# Patient Record
Sex: FEMALE | Race: WHITE | URBAN - METROPOLITAN AREA
[De-identification: names, ages, dates, MRNs, and addresses within clinical notes are randomized per-mention and may not be internally consistent; named-entity substitution may affect disease eponyms.]

---

## 2020-09-14 ENCOUNTER — OUTPATIENT (OUTPATIENT)
Dept: OUTPATIENT SERVICES | Facility: HOSPITAL | Age: 70
LOS: 1 days | End: 2020-09-14

## 2022-10-26 ENCOUNTER — NON-APPOINTMENT (OUTPATIENT)
Age: 72
End: 2022-10-26

## 2022-10-26 DIAGNOSIS — E78.5 HYPERLIPIDEMIA, UNSPECIFIED: ICD-10-CM

## 2022-10-26 DIAGNOSIS — F17.200 NICOTINE DEPENDENCE, UNSPECIFIED, UNCOMPLICATED: ICD-10-CM

## 2022-10-26 DIAGNOSIS — Z85.9 PERSONAL HISTORY OF MALIGNANT NEOPLASM, UNSPECIFIED: ICD-10-CM

## 2022-10-26 PROBLEM — Z00.00 ENCOUNTER FOR PREVENTIVE HEALTH EXAMINATION: Status: ACTIVE | Noted: 2022-10-26

## 2022-10-26 RX ORDER — ALBUTEROL SULFATE 90 UG/1
AEROSOL, METERED RESPIRATORY (INHALATION)
Refills: 0 | Status: ACTIVE | COMMUNITY

## 2022-10-26 RX ORDER — PANTOPRAZOLE SODIUM 20 MG/1
TABLET, DELAYED RELEASE ORAL
Refills: 0 | Status: ACTIVE | COMMUNITY

## 2022-10-26 RX ORDER — BUDESONIDE AND FORMOTEROL FUMARATE DIHYDRATE 160; 4.5 UG/1; UG/1
AEROSOL RESPIRATORY (INHALATION)
Refills: 0 | Status: ACTIVE | COMMUNITY

## 2022-10-26 RX ORDER — ESCITALOPRAM OXALATE 5 MG/1
TABLET, FILM COATED ORAL
Refills: 0 | Status: ACTIVE | COMMUNITY

## 2022-10-26 RX ORDER — FLUTICASONE PROPIONATE 44 UG/1
44 AEROSOL, METERED RESPIRATORY (INHALATION)
Refills: 0 | Status: ACTIVE | COMMUNITY

## 2022-11-18 ENCOUNTER — NON-APPOINTMENT (OUTPATIENT)
Age: 72
End: 2022-11-18

## 2023-01-20 ENCOUNTER — APPOINTMENT (OUTPATIENT)
Dept: ENDOCRINOLOGY | Facility: CLINIC | Age: 73
End: 2023-01-20
Payer: MEDICARE

## 2023-01-20 VITALS
TEMPERATURE: 97 F | SYSTOLIC BLOOD PRESSURE: 134 MMHG | RESPIRATION RATE: 18 BRPM | OXYGEN SATURATION: 95 % | DIASTOLIC BLOOD PRESSURE: 86 MMHG | HEIGHT: 63 IN | WEIGHT: 135 LBS | BODY MASS INDEX: 23.92 KG/M2 | HEART RATE: 93 BPM

## 2023-01-20 PROCEDURE — 99213 OFFICE O/P EST LOW 20 MIN: CPT

## 2023-01-20 NOTE — HISTORY OF PRESENT ILLNESS
[FreeTextEntry1] : This is a pleasant 72-year-old white female with a past medical history of hypothyroidism and is currently on levothyroxine 125 mcg tablets every day.  Patient had developed thyroid disease after undergoing surgery for microscopic papillary CA and also removal of 2 glands of the parathyroid.  Patient also has a history of asthma hyperlipidemia COPD and anxiety.  Patient currently denies any significant symptoms.  She claimed that she is trying to quit smoking and is cutting down on the number of cigarettes every day.  Her weight has been stable her appetite is good but she is not able to walk due to shortness of breath.  Patient is compliant with her medication.

## 2023-01-20 NOTE — PHYSICAL EXAM
[Alert] : alert [No Acute Distress] : no acute distress [Normal Sclera/Conjunctiva] : normal sclera/conjunctiva [EOMI] : extra ocular movement intact [PERRL] : pupils equal, round and reactive to light [Normal Outer Ear/Nose] : the ears and nose were normal in appearance [No Neck Mass] : no neck mass was observed [Well Healed Scar] : well healed scar [No Respiratory Distress] : no respiratory distress [Normal S1, S2] : normal S1 and S2 [No Murmurs] : no murmurs [Normal Rate] : heart rate was normal [Regular Rhythm] : with a regular rhythm [Carotids Normal] : carotid pulses were normal with no bruits [Pedal Pulses Normal] : the pedal pulses are present [Not Tender] : non-tender [Soft] : abdomen soft [No HSM] : no hepato-splenomegaly [Normal Supraclavicular Nodes] : no supraclavicular lymphadenopathy [Normal Anterior Cervical Nodes] : no anterior cervical lymphadenopathy [Normal Posterior Cervical Nodes] : no posterior cervical lymphadenopathy [No CVA Tenderness] : no ~M costovertebral angle tenderness [Normal Gait] : normal gait [No Clubbing, Cyanosis] : no clubbing  or cyanosis of the fingernails [No Rash] : no rash [No Skin Lesions] : no skin lesions [No Motor Deficits] : the motor exam was normal [No Sensory Deficits] : the sensory exam was normal to light touch and pinprick [Normal Reflexes] : deep tendon reflexes were 2+ and symmetric [No Tremors] : no tremors [Oriented x3] : oriented to person, place, and time [de-identified] : Few bilateral rhonchi [de-identified] : Deferred [FreeTextEntry1] : Deferred [de-identified] : Deferred [de-identified] : Denies any palpitations skin rash or joint pain [de-identified] : Decreased skin turgor

## 2023-01-20 NOTE — REVIEW OF SYSTEMS
[Fatigue] : fatigue [Decreased Appetite] : decreased appetite [Shortness Of Breath] : shortness of breath [Cough] : cough [Wheezing] : wheezing [SOB on Exertion] : shortness of breath on exertion [Cold Intolerance] : no cold intolerance [Heat Intolerance] : no heat intolerance [Negative] : Heme/Lymph

## 2023-01-20 NOTE — ASSESSMENT
[FreeTextEntry1] : Middle-aged white female who has past medical history of postsurgical hypothyroidism and who is currently maintained on levothyroxine 125 mcg tablets every day.  She recently had some fluctuations in her TSH level but her last level from January 17, 2023 shows the TSH is normal at 3.84 and the free T4 is 1.36.  Patient is clinically euthyroid.  Recommendation\par 1.  We will continue the levothyroxine at 125 mcg tablets every day.\par #2 we will repeat her sonogram of the thyroid and also a serum thyroglobulin level with with serum calcium on her next visit in 6 months time.  The plan was discussed in detail with the patient.\par

## 2023-05-19 ENCOUNTER — NON-APPOINTMENT (OUTPATIENT)
Age: 73
End: 2023-05-19

## 2023-05-19 ENCOUNTER — APPOINTMENT (OUTPATIENT)
Dept: PULMONOLOGY | Facility: CLINIC | Age: 73
End: 2023-05-19
Payer: MEDICARE

## 2023-05-19 VITALS
TEMPERATURE: 98 F | DIASTOLIC BLOOD PRESSURE: 76 MMHG | BODY MASS INDEX: 27.99 KG/M2 | HEART RATE: 85 BPM | WEIGHT: 157.98 LBS | SYSTOLIC BLOOD PRESSURE: 138 MMHG | OXYGEN SATURATION: 98 % | HEIGHT: 63 IN

## 2023-05-19 PROCEDURE — 99204 OFFICE O/P NEW MOD 45 MIN: CPT | Mod: 25

## 2023-05-19 PROCEDURE — 94010 BREATHING CAPACITY TEST: CPT

## 2023-05-19 RX ORDER — PLANT STANOL ESTER 450 MG
TABLET ORAL
Refills: 0 | Status: ACTIVE | COMMUNITY

## 2023-05-19 RX ORDER — PREDNISONE 10 MG/1
10 TABLET ORAL
Qty: 30 | Refills: 1 | Status: ACTIVE | COMMUNITY
Start: 2023-05-19 | End: 1900-01-01

## 2023-05-19 NOTE — REASON FOR VISIT
[Initial] : an initial visit [Asthma] : asthma [Cough] : cough [COPD] : COPD [Shortness of Breath] : shortness of breath [Wheezing] : wheezing [TextBox_44] : Patient has not followed up in a few years.  Patient still smokes.  She does have a wet yellow phlegm cough, SOB with exertion, Wheezing if she does not use the nebulizer.

## 2023-05-19 NOTE — PHYSICAL EXAM
[No Acute Distress] : no acute distress [Normal Oropharynx] : normal oropharynx [Normal Appearance] : normal appearance [No Neck Mass] : no neck mass [Normal Rate/Rhythm] : normal rate/rhythm [Normal S1, S2] : normal s1, s2 [No Murmurs] : no murmurs [No Resp Distress] : no resp distress [Clear to Auscultation Bilaterally] : clear to auscultation bilaterally [No Abnormalities] : no abnormalities [Benign] : benign [Normal Gait] : normal gait [No Clubbing] : no clubbing [No Cyanosis] : no cyanosis [No Edema] : no edema [FROM] : FROM [Normal Color/ Pigmentation] : normal color/ pigmentation [No Focal Deficits] : no focal deficits [Oriented x3] : oriented x3 [Normal Affect] : normal affect [TextBox_68] : marked decrease bs

## 2023-05-19 NOTE — HISTORY OF PRESENT ILLNESS
[Current] : current [Never] : never [TextBox_4] : 5/19/23  The patient is a known hx of COPD  smoked  cigarettes  2pk /day for 30 yrs  and now .25 to .5 over the last 10 yrs  She has been on inhaled steroids and bronchodilators    Recent worsening of her condition  coughing up sputum  white to yellow no increase in the chronic amount, wheezing is controlled with medication. She wheezes with exertion despite the medication   She has decreased her ADLs and has to pace herself    [TextBox_11] : .50 [TextBox_13] : 35

## 2023-05-19 NOTE — REVIEW OF SYSTEMS
[Fatigue] : fatigue [Recent Wt Gain (___ Lbs)] : ~T no recent weight gain [Recent Wt Loss (___ Lbs)] : ~T no recent weight loss [Nasal Congestion] : nasal congestion [Cough] : cough [GERD] : gerd [Arthralgias] : arthralgias [Negative] : Endocrine [TextBox_30] : clear to yellow

## 2023-05-19 NOTE — ASSESSMENT
[FreeTextEntry1] : 5/19/23  Mrs Mortensen is a 73yo female with known COPD and has decreased ADLs.  She had been a patient but with COVID did not F/U  She had been smoking  2pk/d for  30yrs from age 34 to 64 and then cut down to 5 cig/d  but is up to 10 cig/d  now  She had a spirometry which showed  severe airway obstruction with an FEV1 of .97L 47%  of predicted indicating  severe obstructive pulmonary disease. Her last Ct scan was at [facility]  and was surprisingly normal in appearance with just mild evidence of centrilobular emphysema  and no nodules.  It was impressed on to the patient that she needs to stop smoking as the inflammation the cigarettes cause NULLIFY the benefits of the medications   She is in the severe stages of lung impairment and she will continue to lose her ability to function   She is on adequate Resp Medications I will acutely add a course od steroids and a f/u ct of the chest see if in four years she has more severe anatomical lung disease   time spent 45minutes  counseling, education, documentation, imaging reviewed, medication reviewed, inhaler demonstrated, old records reviewed, HX and PE

## 2023-06-02 ENCOUNTER — APPOINTMENT (OUTPATIENT)
Dept: PULMONOLOGY | Facility: CLINIC | Age: 73
End: 2023-06-02
Payer: MEDICARE

## 2023-06-02 ENCOUNTER — NON-APPOINTMENT (OUTPATIENT)
Age: 73
End: 2023-06-02

## 2023-06-02 VITALS
TEMPERATURE: 98 F | OXYGEN SATURATION: 97 % | HEART RATE: 106 BPM | BODY MASS INDEX: 27.82 KG/M2 | SYSTOLIC BLOOD PRESSURE: 128 MMHG | WEIGHT: 157 LBS | HEIGHT: 63 IN | DIASTOLIC BLOOD PRESSURE: 74 MMHG

## 2023-06-02 DIAGNOSIS — F41.9 ANXIETY DISORDER, UNSPECIFIED: ICD-10-CM

## 2023-06-02 PROCEDURE — 94010 BREATHING CAPACITY TEST: CPT

## 2023-06-02 PROCEDURE — 99214 OFFICE O/P EST MOD 30 MIN: CPT | Mod: 25

## 2023-06-02 NOTE — REASON FOR VISIT
[Follow-Up] : a follow-up visit [COPD] : COPD [TextBox_44] : 2 week.  Patient states the prednisone helped her.  She is not coughing or have SOB right now.  She was suppose to have an Xray but radiology told her she had to wait 6 weeks because she was on prednisone.

## 2023-06-02 NOTE — ASSESSMENT
[FreeTextEntry1] : 5/19/23  Mrs Mortensen is a 73yo female with known COPD and has decreased ADLs.  She had been a patient but with COVID did not F/U  She had been smoking  2pk/d for  30yrs from age 34 to 64 and then cut down to 5 cig/d  but is up to 10 cig/d  now  She had a spirometry which showed  severe airway obstruction with an FEV1 of .97L 47%  of predicted indicating  severe obstructive pulmonary disease. Her last Ct scan was at [facility]  and was surprisingly normal in appearance with just mild evidence of centrilobular emphysema  and no nodules.  It was impressed on to the patient that she needs to stop smoking as the inflammation the cigarettes cause NULLIFY the benefits of the medications   She is in the severe stages of lung impairment and she will continue to lose her ability to function   She is on adequate Resp Medications I will acutely add a course od steroids and a f/u ct of the chest see if in four years she has more severe anatomical lung disease   time spent 45minutes  counseling, education, documentation, imaging reviewed, medication reviewed, inhaler demonstrated, old records reviewed, HX and PE

## 2023-06-02 NOTE — HISTORY OF PRESENT ILLNESS
[Current] : current [Never] : never [TextBox_4] : 5/19/23  The patient is a known hx of COPD  smoked  cigarettes  2pk /day for 30 yrs  and now .25 to .5 over the last 10 yrs  She has been on inhaled steroids and bronchodilators    Recent worsening of her condition  coughing up sputum  white to yellow no increase in the chronic amount, wheezing is controlled with medication. She wheezes with exertion despite the medication   She has decreased her ADLs and has to pace herself   \par \par 6/2/23  the patient is a prior heavy smoker and still smokes  she has had exacerbation of her COPD   She feels better on the tapering doses of prednisone and  the regular use of the inhalers  Her cough is covered for a large degree. The patient is going to have to stay on a regular medication regimen. She has a much improved FEV1 to 1.30Lor 63%  compared to her FEV1 of .97L and 47%    this is a terrific upgrade and she needs to take her medication every day .  time spent 30min . counseling, education, documentation, imaging reviewed, medication reviewed, inhaler demonstrated, old records reviewed, HX and PE  [TextBox_11] : .50 [TextBox_13] : 35

## 2023-07-19 ENCOUNTER — APPOINTMENT (OUTPATIENT)
Dept: ENDOCRINOLOGY | Facility: CLINIC | Age: 73
End: 2023-07-19

## 2023-08-08 ENCOUNTER — APPOINTMENT (OUTPATIENT)
Dept: PULMONOLOGY | Facility: CLINIC | Age: 73
End: 2023-08-08

## 2023-09-27 ENCOUNTER — RX ONLY (RX ONLY)
Age: 73
End: 2023-09-27

## 2023-09-29 ENCOUNTER — OFFICE (OUTPATIENT)
Facility: LOCATION | Age: 73
Setting detail: OPHTHALMOLOGY
End: 2023-09-29
Payer: MEDICARE

## 2023-09-29 DIAGNOSIS — Z13.5: ICD-10-CM

## 2023-09-29 DIAGNOSIS — D31.31: ICD-10-CM

## 2023-09-29 DIAGNOSIS — H16.223: ICD-10-CM

## 2023-09-29 DIAGNOSIS — H43.393: ICD-10-CM

## 2023-09-29 PROCEDURE — 92250 FUNDUS PHOTOGRAPHY W/I&R: CPT | Performed by: OPHTHALMOLOGY

## 2023-09-29 PROCEDURE — 92014 COMPRE OPH EXAM EST PT 1/>: CPT | Performed by: OPHTHALMOLOGY

## 2023-09-29 ASSESSMENT — CORNEAL DYSTROPHY - POSTERIOR
OD_POSTERIORDYSTROPHY: GUTTATA
OS_POSTERIORDYSTROPHY: GUTTATA

## 2023-09-29 ASSESSMENT — TONOMETRY
OD_IOP_MMHG: 14
OS_IOP_MMHG: 14

## 2023-09-29 ASSESSMENT — CONFRONTATIONAL VISUAL FIELD TEST (CVF)
OS_FINDINGS: FULL
OD_FINDINGS: FULL

## 2023-09-29 ASSESSMENT — SUPERFICIAL PUNCTATE KERATITIS (SPK)
OD_SPK: 1+
OS_SPK: 1+

## 2023-10-03 PROBLEM — H16.223: Status: ACTIVE | Noted: 2023-09-29

## 2023-10-03 ASSESSMENT — REFRACTION_AUTOREFRACTION
OS_SPHERE: +0.25
OD_SPHERE: +0.25
OD_AXIS: 177
OD_CYLINDER: +0.75
OS_CYLINDER: +0.75
OS_AXIS: 167

## 2023-10-03 ASSESSMENT — REFRACTION_CURRENTRX
OS_CYLINDER: SPHERE
OS_OVR_VA: 20/
OD_CYLINDER: SPHERE
OD_SPHERE: +2.75
OS_SPHERE: +2.75
OD_OVR_VA: 20/

## 2023-10-03 ASSESSMENT — VISUAL ACUITY
OD_BCVA: 20/25-2
OS_BCVA: 20/30-1

## 2023-10-03 ASSESSMENT — SPHEQUIV_DERIVED
OS_SPHEQUIV: 0.625
OD_SPHEQUIV: 0.625

## 2024-02-23 ENCOUNTER — APPOINTMENT (OUTPATIENT)
Dept: ENDOCRINOLOGY | Facility: CLINIC | Age: 74
End: 2024-02-23
Payer: MEDICARE

## 2024-02-23 VITALS
WEIGHT: 162 LBS | OXYGEN SATURATION: 97 % | HEART RATE: 105 BPM | DIASTOLIC BLOOD PRESSURE: 82 MMHG | BODY MASS INDEX: 28.7 KG/M2 | SYSTOLIC BLOOD PRESSURE: 130 MMHG | RESPIRATION RATE: 16 BRPM | TEMPERATURE: 97.4 F | HEIGHT: 63 IN

## 2024-02-23 DIAGNOSIS — E89.0 POSTPROCEDURAL HYPOTHYROIDISM: ICD-10-CM

## 2024-02-23 PROCEDURE — G2211 COMPLEX E/M VISIT ADD ON: CPT

## 2024-02-23 PROCEDURE — 99214 OFFICE O/P EST MOD 30 MIN: CPT

## 2024-02-23 NOTE — ASSESSMENT
[FreeTextEntry1] : Middle-age white female who has a past medical history of hypothyroidism secondary to thyroidectomy for micro follicular cancer.  Patient is currently on levothyroxine 125 mcg tablets every day.  Her more lab test from February 1 of this year showed that her complete metabolic panel is normal, TSH is elevated 6.72 and the free T4 is 1.4.  Patient with uncontrolled hypothyroidism with elevation of the TSH level the cause of this is not clear.  The patient now has a small palpable nodule in the right lower pole which raises the suspicion for a thyroid tumor.  Recommendation I have advised the patient to increase the levothyroxine to 137 mcg tablets 1 tablet daily on Saturday and Sunday and for the rest of the days to continue with levothyroxine 125 mcg tablets daily.  She is well aware of the fact that she has to take it early in the morning on empty stomach without any additional foods. 2.  I am also referring the patient for right the sonogram for a better evaluation of the possible right lower pole thyroid nodule.  If the study confirms the presence of a thyroid nodule the patient may then require repeat fine-needle aspiration biopsy 3.  Will repeat her thyroid profile together with the serum thyroglobulin level in approximately 6 to 8 weeks. Patient will return to the office in approximately 6 to 8 weeks after obtaining a repeat blood panel.  The plan was discussed in detail with the patient.

## 2024-02-23 NOTE — REVIEW OF SYSTEMS
[Nausea] : no nausea [SOB on Exertion] : shortness of breath on exertion [Constipation] : no constipation [Muscle Weakness] : muscle weakness [Vomiting] : no vomiting [Dizziness] : no dizziness [Headaches] : no headaches [Polydipsia] : no polydipsia [Negative] : Heme/Lymph

## 2024-02-23 NOTE — HISTORY OF PRESENT ILLNESS
[FreeTextEntry1] : Patient last seen 1/20/2023 most recent labs dated for 2/13/2024 from [facility]. Weight today 162. 83-year-old white female with a past medical history of hypothyroidism secondary to total thyroidectomy for microscopic follicular cancer now presents for follow-up.  Patient currently is on thyroxine 125 mcg tablets daily.  Her most recent TSH level was elevated at 6.72.  Patient also has a history of severe COPD, bronchial asthma and anxiety and hyperlipidemia.  Patient denies any significant complaints except for chronic fatigue and tiredness.  Her appetite is good and she is calm with her medications.  She denies any heat or cold intolerance, palpitations chest pain nausea vomiting abdominal pain or muscle cramps.  She does get short of breath on exertion.  Current medications also include Flovent inhaler, Lexapro, Protonix and Symbicort

## 2024-02-23 NOTE — PHYSICAL EXAM
[Well Nourished] : well nourished [Alert] : alert [Normal Sclera/Conjunctiva] : normal sclera/conjunctiva [No Acute Distress] : no acute distress [Well Developed] : well developed [No Proptosis] : no proptosis [EOMI] : extra ocular movement intact [Thyroid Not Enlarged] : the thyroid was not enlarged [Normal Oropharynx] : the oropharynx was normal [No Respiratory Distress] : no respiratory distress [No Thyroid Nodules] : no palpable thyroid nodules [Well Healed Scar] : well healed scar [Clear to Auscultation] : lungs were clear to auscultation bilaterally [No Accessory Muscle Use] : no accessory muscle use [Normal S1, S2] : normal S1 and S2 [Normal Rate] : heart rate was normal [Regular Rhythm] : with a regular rhythm [No Edema] : no peripheral edema [Not Tender] : non-tender [Pedal Pulses Normal] : the pedal pulses are present [Normal Bowel Sounds] : normal bowel sounds [Soft] : abdomen soft [Not Distended] : not distended [Normal Posterior Cervical Nodes] : no posterior cervical lymphadenopathy [Normal Anterior Cervical Nodes] : no anterior cervical lymphadenopathy [Spine Straight] : spine straight [No Spinal Tenderness] : no spinal tenderness [No Stigmata of Cushings Syndrome] : no stigmata of Cushings Syndrome [Normal Gait] : normal gait [Normal Strength/Tone] : muscle strength and tone were normal [No Rash] : no rash [Acanthosis Nigricans] : no acanthosis nigricans [Normal Reflexes] : deep tendon reflexes were 2+ and symmetric [No Tremors] : no tremors [de-identified] : Possible palpable lesion on the right lower pole smooth and nontender and no lymphadenopathy [Oriented x3] : oriented to person, place, and time

## 2024-02-26 ENCOUNTER — RX RENEWAL (OUTPATIENT)
Age: 74
End: 2024-02-26

## 2024-03-10 DIAGNOSIS — R91.8 OTHER NONSPECIFIC ABNORMAL FINDING OF LUNG FIELD: ICD-10-CM

## 2024-03-25 PROBLEM — R91.8 PULMONARY NODULES: Status: ACTIVE | Noted: 2024-03-25

## 2024-03-29 ENCOUNTER — OFFICE (OUTPATIENT)
Facility: LOCATION | Age: 74
Setting detail: OPHTHALMOLOGY
End: 2024-03-29
Payer: MEDICARE

## 2024-03-29 DIAGNOSIS — H18.513: ICD-10-CM

## 2024-03-29 DIAGNOSIS — H43.393: ICD-10-CM

## 2024-03-29 DIAGNOSIS — D31.31: ICD-10-CM

## 2024-03-29 DIAGNOSIS — H16.223: ICD-10-CM

## 2024-03-29 PROCEDURE — 92014 COMPRE OPH EXAM EST PT 1/>: CPT | Performed by: OPHTHALMOLOGY

## 2024-03-29 PROCEDURE — 92250 FUNDUS PHOTOGRAPHY W/I&R: CPT | Performed by: OPHTHALMOLOGY

## 2024-04-01 PROBLEM — Z96.1 PSEUDOPHAKIA ; BOTH EYES: Status: ACTIVE | Noted: 2024-03-29

## 2024-04-01 ASSESSMENT — REFRACTION_CURRENTRX
OS_SPHERE: +2.75
OS_CYLINDER: SPHERE
OD_SPHERE: +2.75
OS_OVR_VA: 20/
OD_CYLINDER: SPHERE
OD_OVR_VA: 20/

## 2024-04-01 ASSESSMENT — KERATOMETRY
OS_AXISANGLE_DEGREES: 3
OS_K1POWER_DIOPTERS: 47.50
OD_AXISANGLE_DEGREES: 29
OD_K2POWER_DIOPTERS: 48.25
OD_K1POWER_DIOPTERS: 47.75
OS_K2POWER_DIOPTERS: 48.25

## 2024-05-01 ENCOUNTER — APPOINTMENT (OUTPATIENT)
Dept: ENDOCRINOLOGY | Facility: CLINIC | Age: 74
End: 2024-05-01
Payer: MEDICARE

## 2024-05-01 VITALS
BODY MASS INDEX: 28.17 KG/M2 | OXYGEN SATURATION: 95 % | DIASTOLIC BLOOD PRESSURE: 80 MMHG | HEART RATE: 105 BPM | TEMPERATURE: 96.5 F | WEIGHT: 159 LBS | HEIGHT: 63 IN | SYSTOLIC BLOOD PRESSURE: 130 MMHG | RESPIRATION RATE: 16 BRPM

## 2024-05-01 DIAGNOSIS — C73 MALIGNANT NEOPLASM OF THYROID GLAND: ICD-10-CM

## 2024-05-01 DIAGNOSIS — E03.9 HYPOTHYROIDISM, UNSPECIFIED: ICD-10-CM

## 2024-05-01 DIAGNOSIS — J44.9 CHRONIC OBSTRUCTIVE PULMONARY DISEASE, UNSPECIFIED: ICD-10-CM

## 2024-05-01 DIAGNOSIS — J45.909 UNSPECIFIED ASTHMA, UNCOMPLICATED: ICD-10-CM

## 2024-05-01 PROCEDURE — 99214 OFFICE O/P EST MOD 30 MIN: CPT

## 2024-05-01 PROCEDURE — G2211 COMPLEX E/M VISIT ADD ON: CPT

## 2024-05-01 RX ADMIN — LEVOTHYROXINE SODIUM 0 MCG: 0.14 TABLET ORAL at 00:00

## 2024-05-01 NOTE — PHYSICAL EXAM
[Alert] : alert [Well Nourished] : well nourished [No Acute Distress] : no acute distress [Well Developed] : well developed [Normal Sclera/Conjunctiva] : normal sclera/conjunctiva [EOMI] : extra ocular movement intact [PERRL] : pupils equal, round and reactive to light [No Proptosis] : no proptosis [Normal Outer Ear/Nose] : the ears and nose were normal in appearance [Normal Oropharynx] : the oropharynx was normal [Thyroid Not Enlarged] : the thyroid was not enlarged [No Thyroid Nodules] : no palpable thyroid nodules [Well Healed Scar] : well healed scar [No Respiratory Distress] : no respiratory distress [No Accessory Muscle Use] : no accessory muscle use [Clear to Auscultation] : lungs were clear to auscultation bilaterally [Normal S1, S2] : normal S1 and S2 [Normal Rate] : heart rate was normal [Regular Rhythm] : with a regular rhythm [No Edema] : no peripheral edema [Pedal Pulses Normal] : the pedal pulses are present [Normal Bowel Sounds] : normal bowel sounds [Not Tender] : non-tender [Not Distended] : not distended [Soft] : abdomen soft [Normal Anterior Cervical Nodes] : no anterior cervical lymphadenopathy [Normal Posterior Cervical Nodes] : no posterior cervical lymphadenopathy [No Spinal Tenderness] : no spinal tenderness [Spine Straight] : spine straight [No Stigmata of Cushings Syndrome] : no stigmata of Cushings Syndrome [Normal Gait] : normal gait [Normal Strength/Tone] : muscle strength and tone were normal [No Rash] : no rash [Acanthosis Nigricans] : no acanthosis nigricans [Normal Reflexes] : deep tendon reflexes were 2+ and symmetric [No Tremors] : no tremors [Oriented x3] : oriented to person, place, and time

## 2024-05-01 NOTE — REVIEW OF SYSTEMS
[Fatigue] : fatigue [Decreased Appetite] : decreased appetite [Fever] : no fever [Chills] : no chills [Cough] : cough [Wheezing] : wheezing [SOB on Exertion] : shortness of breath on exertion [Depression] : depression [Anxiety] : anxiety [Polydipsia] : no polydipsia [Negative] : Heme/Lymph

## 2024-05-01 NOTE — ASSESSMENT
[FreeTextEntry1] : Middle-age white female who has a past medical history of microscopic follicular cancer of the thyroid, status post total thyroidectomy and hypothyroidism.  Patient recently had a blood test performed I in February of this year which showed that the TSH level is still high at 6.72 and the free T4.4.  Complete metabolic panel is within normal range.  Her other medications include Flovent, Lexapro, ProAir, Protonix, Symbicort and vitamin D3.  Patient currently is clinically stable without any evidence of respiratory distress.  However her TSH level is still elevated.  The cause of this is not clear but I suspect there might be a drug to drug interaction.  Recommendation 1.  I have advised the patient to increase the levothyroxine 137 mcg tablets 3 days a week and to continue with 125 mcg tablets for 4 days a week. 2.  Patient is advised to take her calcium supplements and Lexapro at least 4 hours after her morning dose of levothyroxine 3 the importance of diet exercise and a healthy diet was discussed with the patient. 4.  Patient will have a repeat blood drawn for the team level in approximately 6 weeks time and I shall contact her over the telephone with the results.  The plan was discussed in detail with the patient thank you

## 2024-05-01 NOTE — HISTORY OF PRESENT ILLNESS
[FreeTextEntry1] : This is a 73-year-old white female who has a past medical history of hypothyroidism secondary to a total thyroidectomy.  Patient was diagnosed to have a microscopic follicular cancer and since then she has been maintained on different doses of Synthroid.  She is currently on Synthroid 125 mcg tablets daily for 5 days and 137 mics tablets on Saturday and Sunday.  Her other medical conditions include bronchial asthma, COPD, hyper lipidemia, anxiety, pulmonary nodules.  Patient currently denies any significant symptoms except for chronic fatigue and tiredness.  She also becomes short of breath very easily on exertion.  She has no wheezing episodes.  And she is still a smoker.  She denies any palpitations chest pains headache difficulty swallowing, neck pain or hoarseness patient has not experienced any chest pains nausea vomiting diarrhea but she does complain of diffuse joint pains.  Physically she is active and taking care of her animals and also her backyard.

## 2024-05-06 RX ORDER — LEVOTHYROXINE SODIUM 0.14 MG/1
137 TABLET ORAL
Qty: 30 | Refills: 0 | Status: DISCONTINUED | COMMUNITY
Start: 2024-02-23 | End: 2024-05-06

## 2024-05-21 RX ORDER — LEVOTHYROXINE SODIUM 0.12 MG/1
125 TABLET ORAL
Qty: 30 | Refills: 11 | Status: ACTIVE | COMMUNITY

## 2024-10-01 ENCOUNTER — OFFICE (OUTPATIENT)
Facility: LOCATION | Age: 74
Setting detail: OPHTHALMOLOGY
End: 2024-10-01
Payer: MEDICARE

## 2024-10-01 DIAGNOSIS — D31.31: ICD-10-CM

## 2024-10-01 DIAGNOSIS — H18.513: ICD-10-CM

## 2024-10-01 DIAGNOSIS — H16.223: ICD-10-CM

## 2024-10-01 PROCEDURE — 92250 FUNDUS PHOTOGRAPHY W/I&R: CPT | Performed by: OPHTHALMOLOGY

## 2024-10-01 PROCEDURE — 92014 COMPRE OPH EXAM EST PT 1/>: CPT | Performed by: OPHTHALMOLOGY

## 2024-10-01 ASSESSMENT — SUPERFICIAL PUNCTATE KERATITIS (SPK)
OD_SPK: 1+
OS_SPK: 1+

## 2024-10-01 ASSESSMENT — CORNEAL DYSTROPHY - POSTERIOR
OD_POSTERIORDYSTROPHY: GUTTATA
OS_POSTERIORDYSTROPHY: GUTTATA

## 2024-10-01 ASSESSMENT — TONOMETRY
OD_IOP_MMHG: 14
OS_IOP_MMHG: 15

## 2024-10-01 ASSESSMENT — CONFRONTATIONAL VISUAL FIELD TEST (CVF)
OS_FINDINGS: FULL
OD_FINDINGS: FULL

## 2024-10-04 ASSESSMENT — REFRACTION_CURRENTRX
OS_OVR_VA: 20/
OS_VPRISM_DIRECTION: SV
OD_OVR_VA: 20/
OD_CYLINDER: SPHERE
OS_SPHERE: +2.75
OS_CYLINDER: SPHERE
OD_VPRISM_DIRECTION: SV
OD_SPHERE: +2.75

## 2024-10-04 ASSESSMENT — KERATOMETRY
OS_K2POWER_DIOPTERS: 48.25
OD_K1POWER_DIOPTERS: 47.75
OS_K1POWER_DIOPTERS: 47.50
OS_AXISANGLE_DEGREES: 3
OD_AXISANGLE_DEGREES: 29
OD_K2POWER_DIOPTERS: 48.25

## 2024-10-04 ASSESSMENT — REFRACTION_AUTOREFRACTION
OD_CYLINDER: +1.00
OS_AXIS: 004
OS_SPHERE: +0.25
OD_AXIS: 004
OD_SPHERE: +0.75
OS_CYLINDER: +1.50

## 2024-10-04 ASSESSMENT — VISUAL ACUITY
OS_BCVA: 20/30+2
OD_BCVA: 20/25-1

## 2024-10-21 ENCOUNTER — APPOINTMENT (OUTPATIENT)
Dept: ENDOCRINOLOGY | Facility: CLINIC | Age: 74
End: 2024-10-21
Payer: MEDICARE

## 2024-10-21 VITALS
HEART RATE: 90 BPM | WEIGHT: 153 LBS | SYSTOLIC BLOOD PRESSURE: 126 MMHG | TEMPERATURE: 97.5 F | DIASTOLIC BLOOD PRESSURE: 80 MMHG | OXYGEN SATURATION: 94 % | BODY MASS INDEX: 27.11 KG/M2 | HEIGHT: 63 IN

## 2024-10-21 DIAGNOSIS — E03.9 HYPOTHYROIDISM, UNSPECIFIED: ICD-10-CM

## 2024-10-21 DIAGNOSIS — C73 MALIGNANT NEOPLASM OF THYROID GLAND: ICD-10-CM

## 2024-10-21 PROCEDURE — 99213 OFFICE O/P EST LOW 20 MIN: CPT

## 2024-10-21 RX ORDER — FLUTICASONE FUROATE, UMECLIDINIUM BROMIDE AND VILANTEROL TRIFENATATE 200; 62.5; 25 UG/1; UG/1; UG/1
POWDER RESPIRATORY (INHALATION)
Refills: 0 | Status: ACTIVE | COMMUNITY

## 2025-01-22 ENCOUNTER — APPOINTMENT (OUTPATIENT)
Dept: ENDOCRINOLOGY | Facility: CLINIC | Age: 75
End: 2025-01-22
Payer: MEDICARE

## 2025-01-22 VITALS
TEMPERATURE: 96.5 F | WEIGHT: 145 LBS | BODY MASS INDEX: 25.69 KG/M2 | SYSTOLIC BLOOD PRESSURE: 116 MMHG | DIASTOLIC BLOOD PRESSURE: 66 MMHG | OXYGEN SATURATION: 96 % | HEART RATE: 92 BPM | HEIGHT: 63 IN

## 2025-01-22 DIAGNOSIS — Z98.890 OTHER SPECIFIED POSTPROCEDURAL STATES: ICD-10-CM

## 2025-01-22 DIAGNOSIS — Z90.89 OTHER SPECIFIED POSTPROCEDURAL STATES: ICD-10-CM

## 2025-01-22 DIAGNOSIS — C73 MALIGNANT NEOPLASM OF THYROID GLAND: ICD-10-CM

## 2025-01-22 DIAGNOSIS — E03.9 HYPOTHYROIDISM, UNSPECIFIED: ICD-10-CM

## 2025-01-22 DIAGNOSIS — F41.9 ANXIETY DISORDER, UNSPECIFIED: ICD-10-CM

## 2025-01-22 PROCEDURE — G2211 COMPLEX E/M VISIT ADD ON: CPT

## 2025-01-22 PROCEDURE — 99214 OFFICE O/P EST MOD 30 MIN: CPT

## 2025-01-27 DIAGNOSIS — Z83.511 FAMILY HISTORY OF GLAUCOMA: ICD-10-CM

## 2025-01-27 DIAGNOSIS — Z77.22 CONTACT WITH AND (SUSPECTED) EXPOSURE TO ENVIRONMENTAL TOBACCO SMOKE (ACUTE) (CHRONIC): ICD-10-CM

## 2025-01-27 DIAGNOSIS — Z78.9 OTHER SPECIFIED HEALTH STATUS: ICD-10-CM

## 2025-01-27 DIAGNOSIS — Z82.3 FAMILY HISTORY OF STROKE: ICD-10-CM

## 2025-01-27 DIAGNOSIS — Z82.49 FAMILY HISTORY OF ISCHEMIC HEART DISEASE AND OTHER DISEASES OF THE CIRCULATORY SYSTEM: ICD-10-CM

## 2025-01-27 DIAGNOSIS — Z87.39 PERSONAL HISTORY OF OTHER DISEASES OF THE MUSCULOSKELETAL SYSTEM AND CONNECTIVE TISSUE: ICD-10-CM

## 2025-01-27 DIAGNOSIS — Z83.438 FAMILY HISTORY OF OTHER DISORDER OF LIPOPROTEIN METABOLISM AND OTHER LIPIDEMIA: ICD-10-CM

## 2025-01-27 DIAGNOSIS — F17.200 NICOTINE DEPENDENCE, UNSPECIFIED, UNCOMPLICATED: ICD-10-CM

## 2025-01-27 RX ORDER — LEVOTHYROXINE SODIUM 0.12 MG/1
125 TABLET ORAL
Qty: 30 | Refills: 11 | Status: ACTIVE | COMMUNITY

## 2025-01-27 RX ORDER — ESCITALOPRAM OXALATE 20 MG/1
20 TABLET, FILM COATED ORAL DAILY
Refills: 0 | Status: ACTIVE | COMMUNITY

## 2025-01-27 RX ORDER — PANTOPRAZOLE 40 MG/1
40 TABLET, DELAYED RELEASE ORAL
Qty: 60 | Refills: 2 | Status: ACTIVE | COMMUNITY

## 2025-01-27 RX ORDER — ALBUTEROL SULFATE 90 UG/1
INHALANT RESPIRATORY (INHALATION)
Refills: 0 | Status: COMPLETED | COMMUNITY

## 2025-01-27 RX ORDER — FLUTICASONE FUROATE, UMECLIDINIUM BROMIDE AND VILANTEROL TRIFENATATE 200; 62.5; 25 UG/1; UG/1; UG/1
200-62.5-25 POWDER RESPIRATORY (INHALATION)
Qty: 90 | Refills: 3 | Status: ACTIVE | COMMUNITY

## 2025-01-27 RX ORDER — LEVOTHYROXINE SODIUM 0.14 MG/1
137 TABLET ORAL
Refills: 0 | Status: ACTIVE | COMMUNITY

## 2025-02-07 ENCOUNTER — TRANSCRIPTION ENCOUNTER (OUTPATIENT)
Age: 75
End: 2025-02-07

## 2025-02-10 ENCOUNTER — APPOINTMENT (OUTPATIENT)
Dept: CARE COORDINATION | Facility: HOME HEALTH | Age: 75
End: 2025-02-10
Payer: MEDICARE

## 2025-02-10 DIAGNOSIS — F17.200 NICOTINE DEPENDENCE, UNSPECIFIED, UNCOMPLICATED: ICD-10-CM

## 2025-02-10 DIAGNOSIS — J10.1 INFLUENZA DUE TO OTHER IDENTIFIED INFLUENZA VIRUS WITH OTHER RESPIRATORY MANIFESTATIONS: ICD-10-CM

## 2025-02-10 DIAGNOSIS — J44.9 CHRONIC OBSTRUCTIVE PULMONARY DISEASE, UNSPECIFIED: ICD-10-CM

## 2025-02-10 PROCEDURE — 99406 BEHAV CHNG SMOKING 3-10 MIN: CPT

## 2025-02-10 PROCEDURE — 99349 HOME/RES VST EST MOD MDM 40: CPT | Mod: 25

## 2025-02-11 ENCOUNTER — TRANSCRIPTION ENCOUNTER (OUTPATIENT)
Age: 75
End: 2025-02-11

## 2025-02-11 VITALS
OXYGEN SATURATION: 98 % | RESPIRATION RATE: 16 BRPM | SYSTOLIC BLOOD PRESSURE: 130 MMHG | HEART RATE: 91 BPM | DIASTOLIC BLOOD PRESSURE: 80 MMHG

## 2025-02-11 PROBLEM — J10.1 INFLUENZA A: Status: ACTIVE | Noted: 2025-02-11

## 2025-02-11 RX ORDER — AZITHROMYCIN 500 MG/1
500 TABLET, FILM COATED ORAL
Qty: 2 | Refills: 0 | Status: COMPLETED | COMMUNITY
Start: 2025-02-06

## 2025-02-11 RX ORDER — IPRATROPIUM BROMIDE AND ALBUTEROL SULFATE 2.5; .5 MG/3ML; MG/3ML
0.5-2.5 (3) SOLUTION RESPIRATORY (INHALATION)
Qty: 1 | Refills: 0 | Status: ACTIVE | COMMUNITY

## 2025-02-11 RX ORDER — DENOSUMAB 60 MG/ML
60 INJECTION SUBCUTANEOUS
Refills: 0 | Status: ACTIVE | COMMUNITY

## 2025-02-11 RX ORDER — OSELTAMIVIR PHOSPHATE 75 MG/1
75 CAPSULE ORAL
Qty: 6 | Refills: 0 | Status: COMPLETED | COMMUNITY
Start: 2025-02-06

## 2025-02-11 RX ORDER — GUAIFENESIN/DEXTROMETHORPHAN 100-10MG/5
100-10 SYRUP ORAL
Refills: 0 | Status: ACTIVE | COMMUNITY

## 2025-02-18 ENCOUNTER — TRANSCRIPTION ENCOUNTER (OUTPATIENT)
Age: 75
End: 2025-02-18

## 2025-03-04 ENCOUNTER — TRANSCRIPTION ENCOUNTER (OUTPATIENT)
Age: 75
End: 2025-03-04

## 2025-04-10 ENCOUNTER — APPOINTMENT (OUTPATIENT)
Dept: PULMONOLOGY | Facility: CLINIC | Age: 75
End: 2025-04-10
Payer: MEDICARE

## 2025-04-10 PROCEDURE — 94729 DIFFUSING CAPACITY: CPT | Mod: TC

## 2025-04-10 PROCEDURE — 94010 BREATHING CAPACITY TEST: CPT | Mod: TC

## 2025-04-10 PROCEDURE — 94727 GAS DIL/WSHOT DETER LNG VOL: CPT | Mod: TC

## 2025-04-21 ENCOUNTER — OFFICE (OUTPATIENT)
Facility: LOCATION | Age: 75
Setting detail: OPHTHALMOLOGY
End: 2025-04-21
Payer: MEDICARE

## 2025-04-21 DIAGNOSIS — H16.223: ICD-10-CM

## 2025-04-21 DIAGNOSIS — H18.513: ICD-10-CM

## 2025-04-21 DIAGNOSIS — H43.393: ICD-10-CM

## 2025-04-21 DIAGNOSIS — D31.31: ICD-10-CM

## 2025-04-21 PROCEDURE — 92250 FUNDUS PHOTOGRAPHY W/I&R: CPT | Performed by: OPHTHALMOLOGY

## 2025-04-21 PROCEDURE — 92014 COMPRE OPH EXAM EST PT 1/>: CPT | Performed by: OPHTHALMOLOGY

## 2025-04-21 ASSESSMENT — CORNEAL DYSTROPHY - POSTERIOR
OD_POSTERIORDYSTROPHY: GUTTATA
OS_POSTERIORDYSTROPHY: GUTTATA

## 2025-04-21 ASSESSMENT — CONFRONTATIONAL VISUAL FIELD TEST (CVF)
OD_FINDINGS: FULL
OS_FINDINGS: FULL

## 2025-04-21 ASSESSMENT — SUPERFICIAL PUNCTATE KERATITIS (SPK)
OS_SPK: 1+
OD_SPK: 1+

## 2025-04-21 ASSESSMENT — TONOMETRY
OD_IOP_MMHG: 14
OS_IOP_MMHG: 15

## 2025-04-28 ASSESSMENT — REFRACTION_AUTOREFRACTION
OD_CYLINDER: +1.25
OS_AXIS: 006
OS_SPHERE: +0.25
OD_AXIS: 016
OS_CYLINDER: +1.50
OD_SPHERE: +0.50

## 2025-04-28 ASSESSMENT — REFRACTION_CURRENTRX
OD_VPRISM_DIRECTION: SV
OS_SPHERE: +2.75
OS_OVR_VA: 20/
OS_CYLINDER: SPHERE
OD_SPHERE: +2.75
OD_OVR_VA: 20/
OS_VPRISM_DIRECTION: SV
OD_CYLINDER: SPHERE

## 2025-04-28 ASSESSMENT — VISUAL ACUITY
OD_BCVA: 20/30
OS_BCVA: 20/25-2

## 2025-04-28 ASSESSMENT — KERATOMETRY
OD_K2POWER_DIOPTERS: 48.25
OS_AXISANGLE_DEGREES: 3
OD_AXISANGLE_DEGREES: 29
OD_K1POWER_DIOPTERS: 47.75
OS_K1POWER_DIOPTERS: 47.50
OS_K2POWER_DIOPTERS: 48.25

## 2025-07-21 ENCOUNTER — APPOINTMENT (OUTPATIENT)
Dept: ENDOCRINOLOGY | Facility: CLINIC | Age: 75
End: 2025-07-21